# Patient Record
Sex: MALE | Race: WHITE | Employment: FULL TIME | URBAN - METROPOLITAN AREA
[De-identification: names, ages, dates, MRNs, and addresses within clinical notes are randomized per-mention and may not be internally consistent; named-entity substitution may affect disease eponyms.]

---

## 2021-12-06 ENCOUNTER — APPOINTMENT (OUTPATIENT)
Dept: GENERAL RADIOLOGY | Age: 41
DRG: 177 | End: 2021-12-06
Attending: EMERGENCY MEDICINE
Payer: COMMERCIAL

## 2021-12-06 ENCOUNTER — HOSPITAL ENCOUNTER (INPATIENT)
Facility: HOSPITAL | Age: 41
LOS: 4 days | Discharge: HOME | DRG: 177 | End: 2021-12-10
Attending: EMERGENCY MEDICINE | Admitting: HOSPITALIST
Payer: COMMERCIAL

## 2021-12-06 ENCOUNTER — APPOINTMENT (OUTPATIENT)
Dept: CT IMAGING | Age: 41
DRG: 177 | End: 2021-12-06
Attending: EMERGENCY MEDICINE
Payer: COMMERCIAL

## 2021-12-06 DIAGNOSIS — U07.1 PNEUMONIA DUE TO COVID-19 VIRUS: Primary | ICD-10-CM

## 2021-12-06 DIAGNOSIS — R09.02 HYPOXIA: ICD-10-CM

## 2021-12-06 DIAGNOSIS — E87.1 HYPONATREMIA: ICD-10-CM

## 2021-12-06 DIAGNOSIS — J12.82 PNEUMONIA DUE TO COVID-19 VIRUS: Primary | ICD-10-CM

## 2021-12-06 PROCEDURE — 71045 X-RAY EXAM CHEST 1 VIEW: CPT | Performed by: EMERGENCY MEDICINE

## 2021-12-06 PROCEDURE — 3E0333Z INTRODUCTION OF ANTI-INFLAMMATORY INTO PERIPHERAL VEIN, PERCUTANEOUS APPROACH: ICD-10-PCS | Performed by: HOSPITALIST

## 2021-12-06 PROCEDURE — XW033E5 INTRODUCTION OF REMDESIVIR ANTI-INFECTIVE INTO PERIPHERAL VEIN, PERCUTANEOUS APPROACH, NEW TECHNOLOGY GROUP 5: ICD-10-PCS | Performed by: HOSPITALIST

## 2021-12-06 PROCEDURE — 99223 1ST HOSP IP/OBS HIGH 75: CPT | Performed by: HOSPITALIST

## 2021-12-06 PROCEDURE — 71275 CT ANGIOGRAPHY CHEST: CPT | Performed by: EMERGENCY MEDICINE

## 2021-12-06 RX ORDER — PROCHLORPERAZINE EDISYLATE 5 MG/ML
5 INJECTION INTRAMUSCULAR; INTRAVENOUS EVERY 8 HOURS PRN
Status: DISCONTINUED | OUTPATIENT
Start: 2021-12-06 | End: 2021-12-10

## 2021-12-06 RX ORDER — ALBUTEROL SULFATE 90 UG/1
4 AEROSOL, METERED RESPIRATORY (INHALATION)
Status: DISCONTINUED | OUTPATIENT
Start: 2021-12-06 | End: 2021-12-10

## 2021-12-06 RX ORDER — DEXAMETHASONE SODIUM PHOSPHATE 4 MG/ML
6 VIAL (ML) INJECTION DAILY
Status: DISCONTINUED | OUTPATIENT
Start: 2021-12-07 | End: 2021-12-10

## 2021-12-06 RX ORDER — GUAIFENESIN 600 MG
1200 TABLET, EXTENDED RELEASE 12 HR ORAL 2 TIMES DAILY
Status: DISCONTINUED | OUTPATIENT
Start: 2021-12-06 | End: 2021-12-10

## 2021-12-06 RX ORDER — FUROSEMIDE 10 MG/ML
40 INJECTION INTRAMUSCULAR; INTRAVENOUS ONCE
Status: COMPLETED | OUTPATIENT
Start: 2021-12-06 | End: 2021-12-06

## 2021-12-06 RX ORDER — ONDANSETRON 2 MG/ML
4 INJECTION INTRAMUSCULAR; INTRAVENOUS EVERY 6 HOURS PRN
Status: DISCONTINUED | OUTPATIENT
Start: 2021-12-06 | End: 2021-12-10

## 2021-12-06 RX ORDER — SODIUM CHLORIDE 9 MG/ML
INJECTION, SOLUTION INTRAVENOUS CONTINUOUS
Status: DISCONTINUED | OUTPATIENT
Start: 2021-12-06 | End: 2021-12-06

## 2021-12-06 RX ORDER — ENOXAPARIN SODIUM 100 MG/ML
40 INJECTION SUBCUTANEOUS DAILY
Status: DISCONTINUED | OUTPATIENT
Start: 2021-12-06 | End: 2021-12-10

## 2021-12-06 RX ORDER — GUAIFENESIN 600 MG
600 TABLET, EXTENDED RELEASE 12 HR ORAL 2 TIMES DAILY
Status: DISCONTINUED | OUTPATIENT
Start: 2021-12-06 | End: 2021-12-06

## 2021-12-06 RX ORDER — MELATONIN
3 NIGHTLY PRN
Status: DISCONTINUED | OUTPATIENT
Start: 2021-12-06 | End: 2021-12-10

## 2021-12-06 RX ORDER — ACETAMINOPHEN 325 MG/1
650 TABLET ORAL EVERY 6 HOURS PRN
Status: DISCONTINUED | OUTPATIENT
Start: 2021-12-06 | End: 2021-12-10

## 2021-12-06 RX ORDER — DEXAMETHASONE SODIUM PHOSPHATE 10 MG/ML
6 INJECTION, SOLUTION INTRAMUSCULAR; INTRAVENOUS ONCE
Status: COMPLETED | OUTPATIENT
Start: 2021-12-06 | End: 2021-12-06

## 2021-12-06 NOTE — ED PROVIDER NOTES
Patient Seen in: THE [facility] Emergency Department In Ord      History   Patient presents with:  Covid    Stated Complaint: + covid 11/27, low o2 sats    Subjective:   HPI    Patient is a 40-year-old male who is not vaccinated for Covid presents Cas Moralez equally round and reactive to light. Oropharynx is clear. Mucous membranes are moist.  NECK: There is no focal tenderness to palpation appreciated. There is no JVD. No meningeal signs or nuchal rigidity appreciated. No stridor.   LUNGS: Diminished breath so - Normal   TROPONIN I - Normal   TROPONIN I - Normal   CBC WITH DIFFERENTIAL WITH PLATELET    Narrative: The following orders were created for panel order CBC With Differential With Platelet.   Procedure                               Abnormality chemistries, BUN/creatinine, and blood sugar showed evidence of some mild hyponatremia and mild elevation of liver transaminases no other acute normalities noted. The patient C-reactive protein, CPK, LDH, D-dimer are all elevated.   The patient troponin an

## 2021-12-06 NOTE — PROGRESS NOTES
NURSING ADMISSION NOTE      Patient admitted via Ambulance  Oriented to room. Safety precautions initiated. Bed in low position. Call light in reach. Admission navigator complete. Received patient from  ED. Currently on 4L spo2 96%.  Pt reports SO

## 2021-12-06 NOTE — ED INITIAL ASSESSMENT (HPI)
Pt covid + 11/27 - c/o cough and fevers during the week - states today his oxygen levels dropped - he is at 92% after ambulation down joy

## 2021-12-06 NOTE — ED QUICK NOTES
Orders for admission, patient is aware of plan and ready to go upstairs. Any questions, please call ED RN Maxine Triana at extension [phone]. Vaccinated?  Nope  Type of COVID test sent:rapid  COVID Suspicion level: positive on 11/27      Titratable drug(s) infusing

## 2021-12-07 PROCEDURE — 99232 SBSQ HOSP IP/OBS MODERATE 35: CPT | Performed by: INTERNAL MEDICINE

## 2021-12-07 RX ORDER — HYDROCODONE BITARTRATE AND HOMATROPINE METHYLBROMIDE ORAL SOLUTION 5; 1.5 MG/5ML; MG/5ML
5 LIQUID ORAL EVERY 4 HOURS PRN
Status: DISCONTINUED | OUTPATIENT
Start: 2021-12-07 | End: 2021-12-10

## 2021-12-07 NOTE — PLAN OF CARE
COVID-19 Daily Discharge Readiness-Nursing  Temperature max from last 24 hrs: Temp (24hrs), Av.8 °F (37.1 °C), Min:98.2 °F (36.8 °C), Max:100.2 °F (37.9 °C)    Inflammatory Markers:   Recent Labs   Lab 21  1156   CRP 5.81*   DDIMER 1.44*     Remd Goal:   12/6 AM: Decrease O2 needs     Interventions:   - use IS   - Prone position or side lay   -Get sleep   - See additional Care Plan goals for specific interventions  12/6/2021 1804 by Jakob Pat RN  Outcome: Progressing  12/6/2021 1746 by Cer

## 2021-12-07 NOTE — COVID NURSING ASSESSMENT
COVID-19 Daily Discharge Readiness-Nursing    O2 Sat at Rest: 92  % on 4L NC   O2 Sat with Exertion:    % on    liters   Temperature max from last 24 hrs: Temp (24hrs), Av.1 °F (36.7 °C), Min:96.6 °F (35.9 °C), Max:100.2 °F (37.9 °C)    Inflammatory Ma

## 2021-12-07 NOTE — CONSULTS
INFECTIOUS DISEASE CONSULTATION    Nikki Screws Patient Status:  Inpatient    1980 MRN [id number]   [facility] 5NW-A Attending Milton Mendy,    Hosp Day # 1 PCP No primary care provi acetaminophen (TYLENOL) tab 650 mg, 650 mg, Oral, Q6H PRN  •  melatonin tab 3 mg, 3 mg, Oral, Nightly PRN  •  ondansetron (ZOFRAN) injection 4 mg, 4 mg, Intravenous, Q6H PRN  •  prochlorperazine (COMPAZINE) injection 5 mg, 5 mg, Intravenous, Q8H PRN  •  en 46 42*   AST 57* 46*   ALT 77* 69*   BILT 0.6 0.6   TP 7.9 7.1     COVID-19 Lab Results    COVID-19  No results found for: COVID19    Pro-Calcitonin  Recent Labs   Lab 12/06/21  1156   PCT 0.17*       Cardiac  Recent Labs   Lab 12/06/21  1156 12/06/21  141

## 2021-12-07 NOTE — H&P
[facility] HOSPITALIST  History and Physical     Nory Barraza Patient Status:  Inpatient    1980 MRN [id number]   [facility] 5NW-A Attending Jose A Velazco MD   Hosp Day # 0 PCP No primary care provider on file.      Chief Complaint: dyspn Back: No tenderness or deformity. Abdomen: Soft, nontender, nondistended. Positive bowel sounds. No rebound, guarding or organomegaly. Neurologic: No focal neurological deficits. CNII-XII grossly intact. Musculoskeletal: Moves all extremities.   Extremi

## 2021-12-07 NOTE — PLAN OF CARE
COVID-19 Daily Discharge Readiness-Nursing    O2 Sat at Rest:  93   % on 5L  O2 Sat with Exertion: 93 % on 10  liters   Temperature max from last 24 hrs: Temp (24hrs), Av.7 °F (36.5 °C), Min:96.6 °F (35.9 °C), Max:98.6 °F (37 °C)    Inflammatory Marker Adequate: hears normal conversation without difficulty

## 2021-12-07 NOTE — PROGRESS NOTES
[facility]     Hospitalist Progress Note     Shaq Urrutia Patient Status:  Inpatient    1980 MRN [id number]   [facility] 5NW-A Attending Rianna Rice DO   Hosp Day # 1 PCP No primary care provider on file.      Chief Complaint: dys reviewed in Epic.     Medications:   • albuterol  4 puff Inhalation Q4H WA   • enoxaparin  40 mg Subcutaneous Daily   • dexamethasone  6 mg Oral Daily    Or   • dexamethasone Sodium Phosphate  6 mg Intravenous Daily   • guaiFENesin ER  1,200 mg Oral BID   •

## 2021-12-07 NOTE — PAYOR COMM NOTE
--------------  ADMISSION REVIEW     Payor: Zaki Mary #:  [id number]  Authorization Number: [id number]    Admit date: 12/6/21  Admit time:  4:26 PM       REVIEW DOCUMENTATION:            Patient Seen in: THE [facility] Emergency Department I A/G Ratio 0.7 (*)     All other components within normal limits   D-DIMER - Abnormal; Notable for the following components:    D-Dimer 1.44 (*)     All other components within normal limits   LDH - Abnormal; Notable for the following components:     Patient had IV line established blood work drawn including a CBC, chemistries, BUN/creatinine, and blood sugar showed evidence of some mild hyponatremia and mild elevation of liver transaminases no other acute normalities noted.   The patient C-reactive facility-administered medications on file prior to encounter. No current outpatient medications on file prior to encounter. Review of Systems:   A comprehensive 14 point review of systems was completed.     Pertinent positives and negatives noted in the now  b. Monitor d dimer  c. CTA neg  d. Check LE US  4. Hyponatremia  a.  Monitor with diuresis  5. obesity  Plan of care discussed with ED physician    Paresh Wright MD  12/6/2021    MEDICATIONS ADMINISTERED IN LAST 1 DAY:  albuterol 108 (90 Base) MCG/ACT 141/84 91 % — — 4 L/min AW    12/06/21 2100 — — — — — — Nasal cannula 2 L/min MS    12/06/21 2039 97.7 °F (36.5 °C) 102 20 128/82 93 % — Nasal cannula 2 L/min AW    12/06/21 1654 — — — — — 260 lb 8 oz — — BK    12/06/21 1635 — 121 19 129/80 87 % — Nasal ca

## 2021-12-08 ENCOUNTER — APPOINTMENT (OUTPATIENT)
Dept: ULTRASOUND IMAGING | Facility: HOSPITAL | Age: 41
DRG: 177 | End: 2021-12-08
Attending: HOSPITALIST
Payer: COMMERCIAL

## 2021-12-08 PROCEDURE — 99232 SBSQ HOSP IP/OBS MODERATE 35: CPT | Performed by: INTERNAL MEDICINE

## 2021-12-08 PROCEDURE — 93970 EXTREMITY STUDY: CPT | Performed by: HOSPITALIST

## 2021-12-08 RX ORDER — FUROSEMIDE 10 MG/ML
20 INJECTION INTRAMUSCULAR; INTRAVENOUS ONCE
Status: COMPLETED | OUTPATIENT
Start: 2021-12-08 | End: 2021-12-08

## 2021-12-08 NOTE — PROGRESS NOTES
[facility]     Hospitalist Progress Note     Yazmin Skelton Patient Status:  Inpatient    1980 MRN [id number]   [facility] 5NW-A Attending Anne Villalta DO   Hosp Day # 2 PCP No primary care provider on file.      Chief Complaint: dys 12/08/21  0830   CRP 5.81* 5.83* 1.71*   LINDY 882.9* 877.2* 662.1*   * 476* 379*   DDIMER 1.44* 0.83* 0.50*       Imaging: Imaging data reviewed in Epic.     Medications:   • furosemide  20 mg Intravenous Once   • albuterol  4 puff Inhalation Q4H WA

## 2021-12-08 NOTE — PROGRESS NOTES
[facility]                INFECTIOUS DISEASE PROGRESS NOTE    Bernardo Lisa Patient Status:  Inpatient    1980 MRN [id number]   [facility] 5NW-A Attending Drake Ulloa DO   Hosp Day # 2 PCP No primary care provider on file. 12:30 PM    Specimen: Blood,peripheral   Result Value Ref Range    Blood Culture Result No Growth 1 Day N/A         Radiology    CTA  Problem list reviewed:  Patient Active Problem List:     Hyponatremia     Pneumonia due to COVID-19 virus     Hypoxia

## 2021-12-08 NOTE — PLAN OF CARE
Problem:Covid pneumonia  Data: Pt. A/O x4. VSS. Denies pain. SR/ST on tele monitor. O2 weaned to 4L this am desats to 86% with exertion but able to recover at 6L. Haroon. Lungs diminished. Pt. Using IS and able to achieve 2000 with a goal of 2500. Pt.  Reports

## 2021-12-08 NOTE — PLAN OF CARE
COVID-19 Daily Discharge Readiness-Nursing    O2 Sat at Rest: 91    %   O2 Sat with Exertion: ANEL  Temperature max from last 24 hrs: Temp (24hrs), Av.9 °F (36.6 °C), Min:97.6 °F (36.4 °C), Max:98.6 °F (37 °C)    Inflammatory Markers: Recent Labs   Lab - Prone position or side lay   -Get sleep   - See additional Care Plan goals for specific interventions  Outcome: Progressing     Problem: RESPIRATORY - ADULT  Goal: Achieves optimal ventilation and oxygenation  Description: INTERVENTIONS:  - Assess for

## 2021-12-08 NOTE — PAYOR COMM NOTE
--------------  CONTINUED STAY REVIEW    Payor: Zaki Mary #:  [id number]  Authorization Number: [id number]    Admit date: 12/6/21  Admit time:  4:26 PM    12/7      COVID 19 PNEUMONIA     Symptoms fever, body aches started on 11/27 a RN    12/7/2021 0946 Given 40 mg Subcutaneous (Right Lower Abdomen) Shahnaz Arenas RN      guaiFENesin ER [facility]) 12 hr tab 1,200 mg     Date Action Dose Route User    12/8/2021 0828 Given 1,200 mg Oral Fabien Abreu RN    12/7/2021 2127 Given 1,2

## 2021-12-09 PROCEDURE — 99232 SBSQ HOSP IP/OBS MODERATE 35: CPT | Performed by: INTERNAL MEDICINE

## 2021-12-09 RX ORDER — FUROSEMIDE 10 MG/ML
20 INJECTION INTRAMUSCULAR; INTRAVENOUS ONCE
Status: COMPLETED | OUTPATIENT
Start: 2021-12-09 | End: 2021-12-09

## 2021-12-09 NOTE — PLAN OF CARE
COVID-19 Daily Discharge Readiness-Nursing    O2 Sat at Rest: 95    %  On 4L nasal cannula    Temperature max from last 24 hrs: Temp (24hrs), Av.7 °F (36.5 °C), Min:97.5 °F (36.4 °C), Max:98.2 °F (36.8 °C)    Inflammatory Markers: Recent Labs   Lab  Progressing     Problem: RESPIRATORY - ADULT  Goal: Achieves optimal ventilation and oxygenation  Description: INTERVENTIONS:  - Assess for changes in respiratory status  - Assess for changes in mentation and behavior  - Position to facilitate oxygenation

## 2021-12-09 NOTE — PROGRESS NOTES
[facility]     Hospitalist Progress Note     Fuentes Carson Patient Status:  Inpatient    1980 MRN [id number]   [facility] 5NW-A Attending Nirmal Mack, DO   Hosp Day # 3 PCP No primary care provider on file.      Chief Complaint: dys Inflammatory Markers  Recent Labs   Lab 12/06/21  1156 12/07/21  0739 12/08/21  0830   CRP 5.81* 5.83* 1.71*   LINDY 882.9* 877.2* 662.1*   * 476* 379*   DDIMER 1.44* 0.83* 0.50*       Imaging: Imaging data reviewed in Epic.     Medications:   •

## 2021-12-09 NOTE — PROGRESS NOTES
12/09/21 1454   Mobility   O2 walk?  Yes   SPO2% on Oxygen at Rest 94   At rest oxygen flow (liters per minute) 2   SPO2% Ambulation on Oxygen 90   Ambulation oxygen flow (liters per minute) 2     Briefly desaturated to 88% on 2L but able to recover quic

## 2021-12-09 NOTE — COVID NURSING ASSESSMENT
COVID-19 Daily Discharge Readiness-Nursing    O2 Sat at Rest:     93%  On 2L  O2 Sat with Exertion:  90% on  2L  Temperature max from last 24 hrs: Temp (24hrs), Av.8 °F (36.6 °C), Min:97.5 °F (36.4 °C), Max:98.2 °F (36.8 °C)    Inflammatory Markers: Re

## 2021-12-09 NOTE — PROGRESS NOTES
[facility]                INFECTIOUS DISEASE PROGRESS NOTE    Jamil Zimmer Patient Status:  Inpatient    1980 MRN [id number]   [facility] 5NW-A Attending Muna Caban, DO   Hosp Day # 3 PCP No primary care provider on file. Culture     Status: None (Preliminary result)    Collection Time: 12/06/21 12:30 PM    Specimen: Blood,peripheral   Result Value Ref Range    Blood Culture Result No Growth 2 Days N/A         Radiology    CTA  Problem list reviewed:  Patient Active Problem

## 2021-12-10 VITALS
HEART RATE: 64 BPM | OXYGEN SATURATION: 92 % | RESPIRATION RATE: 17 BRPM | HEIGHT: 74 IN | BODY MASS INDEX: 33.8 KG/M2 | TEMPERATURE: 98 F | DIASTOLIC BLOOD PRESSURE: 67 MMHG | WEIGHT: 263.38 LBS | SYSTOLIC BLOOD PRESSURE: 126 MMHG

## 2021-12-10 PROCEDURE — 99239 HOSP IP/OBS DSCHRG MGMT >30: CPT | Performed by: HOSPITALIST

## 2021-12-10 NOTE — PROGRESS NOTES
COVID-19 Daily Discharge Readiness-Nursing    O2 Sat at Rest:     %   O2 Sat with Exertion: SPO2% Ambulation on Oxygen: 90  % on Ambulation oxygen flow (liters per minute): 2  liters   Temperature max from last 24 hrs: Temp (24hrs), Av.9 °F (36.6 °C),

## 2021-12-10 NOTE — PROGRESS NOTES
NURSING DISCHARGE NOTE    Discharged Home via Wheelchair. Accompanied by Support staff  Belongings Taken by patient/family. Discharge navigator complete. Pt assessed and ready for DC. IV removed.  Instructions gone over with pt- verbalized [place]

## 2021-12-10 NOTE — PROGRESS NOTES
[facility]                INFECTIOUS DISEASE PROGRESS NOTE    Nikki Screws Patient Status:  Inpatient    1980 MRN [id number]   [facility] 5NW-A Attending Milton DO Mendy   Hosp Day # 4 PCP No primary care provider on file. Culture     Status: None (Preliminary result)    Collection Time: 12/06/21 12:30 PM    Specimen: Blood,peripheral   Result Value Ref Range    Blood Culture Result No Growth 3 Days N/A         Radiology    CTA  Problem list reviewed:  Patient Active Problem

## 2021-12-10 NOTE — PLAN OF CARE
Multidisciplinary Discharge Rounds held 12/10/2021. Treatment team members present today include , , Charge Nurse, Nurse, RT, PT and Pharmacy caring for [employer].      Other care providers present:    Mobility Goal: Ambulate TID Spirometry  - Assess the need for suctioning and perform as needed  - Assess and instruct to report SOB or any respiratory difficulty  - Respiratory Therapy support as indicated  - Manage/alleviate anxiety  - Monitor for signs/symptoms of CO2 retention  Maryjane Valdez

## 2021-12-10 NOTE — PROGRESS NOTES
12/10/21 1000   Mobility   O2 walk?  Yes   SPO2% on Room Air at Rest 94   SPO2% on Oxygen at Rest 0   At rest oxygen flow (liters per minute) 0   SPO2% Ambulation on Room Air 92   SPO2% Ambulation on Oxygen 0   Ambulation oxygen flow (liters per minute)

## 2021-12-11 NOTE — DISCHARGE SUMMARY
[facility] H[facility]PITAL[facility]  DISCHARGE SUMMARY     Carolina Bello Patient Status:  Inpatient    1980 MRN [id number]   [facility] 5NW-A Attending No att. providers found   [facility] Day # 4 PCP No primary care provider on file.      Date of Admission:  at Discharge:   · none    Consultants:  • ID    Discharge Medication List:     Discharge Medications      You have not been prescribed any medications.          [place]    Follow-up appointment:   PCP    Schedule an appointment as soon as chuck

## 2021-12-13 ENCOUNTER — PATIENT OUTREACH (OUTPATIENT)
Dept: CASE MANAGEMENT | Age: 41
End: 2021-12-13

## 2021-12-13 NOTE — PROGRESS NOTES
Attempted to contact pt for TCM however no answer. Call continued to ring and VM recording stated MB is full, unable to leave a message. NCM to try again at a later time.

## 2021-12-14 NOTE — PROGRESS NOTES
NCM attempted to contact the patient for HFU. No answer and the MB is full. NCM will try again another time.

## 2022-08-08 NOTE — PAYOR COMM NOTE
--------------  DISCHARGE REVIEW    Payor: Zaki Saunders  #:  [id number]  Authorization Number: [id number]    Admit date: 12/6/21  Admit time:   4:26 PM  Discharge Date: 12/10/2021  5:08 PM        Discharge Summary Notes        [facility] MCGUIRE descriptions):  • none    Lab/Test results pending at Discharge:   · none    Consultants:  • ID      Vital signs:  Temp:  [97.7 °F (36.5 °C)-98.1 °F (36.7 °C)] 97.9 °F (36.6 °C)  Pulse:  [64-72] 64  Resp:  [17-18] 17  BP: (125-139)/(67-96) 126/67    Physic Rehan